# Patient Record
Sex: MALE | Race: WHITE | Employment: FULL TIME | URBAN - METROPOLITAN AREA
[De-identification: names, ages, dates, MRNs, and addresses within clinical notes are randomized per-mention and may not be internally consistent; named-entity substitution may affect disease eponyms.]

---

## 2022-05-24 ENCOUNTER — APPOINTMENT (OUTPATIENT)
Dept: GENERAL RADIOLOGY | Age: 27
End: 2022-05-24
Payer: COMMERCIAL

## 2022-05-24 ENCOUNTER — HOSPITAL ENCOUNTER (EMERGENCY)
Age: 27
Discharge: HOME | End: 2022-05-24
Attending: EMERGENCY MEDICINE
Payer: COMMERCIAL

## 2022-05-24 VITALS
OXYGEN SATURATION: 98 % | HEIGHT: 66 IN | RESPIRATION RATE: 16 BRPM | DIASTOLIC BLOOD PRESSURE: 92 MMHG | HEART RATE: 98 BPM | TEMPERATURE: 97.8 F | BODY MASS INDEX: 28.93 KG/M2 | SYSTOLIC BLOOD PRESSURE: 146 MMHG | WEIGHT: 180 LBS

## 2022-05-24 DIAGNOSIS — J20.9 ACUTE BRONCHITIS, UNSPECIFIED ORGANISM: Primary | ICD-10-CM

## 2022-05-24 PROCEDURE — 71046 X-RAY EXAM CHEST 2 VIEWS: CPT

## 2022-05-24 PROCEDURE — 99283 EMERGENCY DEPT VISIT LOW MDM: CPT

## 2022-05-24 RX ORDER — DOXYCYCLINE HYCLATE 100 MG
100 TABLET ORAL 2 TIMES DAILY
Qty: 20 TABLET | Refills: 0 | Status: SHIPPED | OUTPATIENT
Start: 2022-05-24 | End: 2022-06-03

## 2022-05-24 RX ORDER — BROMPHENIRAMINE MALEATE, PSEUDOEPHEDRINE HYDROCHLORIDE, AND DEXTROMETHORPHAN HYDROBROMIDE 2; 30; 10 MG/5ML; MG/5ML; MG/5ML
5 SYRUP ORAL 4 TIMES DAILY PRN
Qty: 120 ML | Refills: 0 | Status: SHIPPED | OUTPATIENT
Start: 2022-05-24

## 2022-05-24 ASSESSMENT — ENCOUNTER SYMPTOMS
SINUS PRESSURE: 0
EYE PAIN: 0
ABDOMINAL PAIN: 0
EYE REDNESS: 0
DIARRHEA: 0
BACK PAIN: 0
SHORTNESS OF BREATH: 0
SORE THROAT: 0
RHINORRHEA: 1
WHEEZING: 0
VOMITING: 0
COUGH: 1
NAUSEA: 0
EYE DISCHARGE: 0

## 2022-05-24 ASSESSMENT — PAIN - FUNCTIONAL ASSESSMENT: PAIN_FUNCTIONAL_ASSESSMENT: NONE - DENIES PAIN

## 2022-05-24 NOTE — ED PROVIDER NOTES
The history is provided by the patient. URI  Presenting symptoms: congestion, cough and rhinorrhea    Presenting symptoms: no ear pain, no fatigue, no fever and no sore throat    Severity:  Moderate  Onset quality:  Gradual  Duration:  3 days  Chronicity:  New  Associated symptoms: no arthralgias, no headaches and no wheezing         Review of Systems   Constitutional: Negative for chills, fatigue and fever. HENT: Positive for congestion and rhinorrhea. Negative for ear pain, sinus pressure and sore throat. Eyes: Negative for pain, discharge and redness. Respiratory: Positive for cough. Negative for shortness of breath and wheezing. Cardiovascular: Negative for chest pain. Gastrointestinal: Negative for abdominal pain, diarrhea, nausea and vomiting. Genitourinary: Negative for dysuria and frequency. Musculoskeletal: Negative for arthralgias and back pain. Skin: Negative for rash and wound. Neurological: Negative for weakness and headaches. Hematological: Negative for adenopathy. All other systems reviewed and are negative. Physical Exam  Vitals and nursing note reviewed. Constitutional:       Appearance: He is well-developed. HENT:      Head: Normocephalic and atraumatic. Jaw: No trismus. Right Ear: Hearing, tympanic membrane and external ear normal.      Left Ear: Hearing, tympanic membrane and external ear normal.      Nose: Mucosal edema and congestion present. Right Sinus: No maxillary sinus tenderness or frontal sinus tenderness. Left Sinus: No maxillary sinus tenderness or frontal sinus tenderness. Mouth/Throat:      Pharynx: Uvula midline. No uvula swelling. Eyes:      General: Lids are normal.      Conjunctiva/sclera: Conjunctivae normal.      Pupils: Pupils are equal, round, and reactive to light. Cardiovascular:      Rate and Rhythm: Normal rate and regular rhythm. Heart sounds: Normal heart sounds. No murmur heard.       Pulmonary: Effort: Pulmonary effort is normal. No respiratory distress. Breath sounds: Rhonchi present. No wheezing or rales. Abdominal:      General: Bowel sounds are normal.      Palpations: Abdomen is soft. Abdomen is not rigid. Tenderness: There is no abdominal tenderness. There is no guarding or rebound. Musculoskeletal:      Cervical back: Normal range of motion and neck supple. Skin:     General: Skin is warm and dry. Findings: No abrasion or rash. Neurological:      Mental Status: He is alert and oriented to person, place, and time. GCS: GCS eye subscore is 4. GCS verbal subscore is 5. GCS motor subscore is 6. Cranial Nerves: No cranial nerve deficit. Sensory: No sensory deficit. Coordination: Coordination normal.      Gait: Gait normal.          Procedures     MDM          --------------------------------------------- PAST HISTORY ---------------------------------------------  Past Medical History:  has no past medical history on file. Past Surgical History:  has no past surgical history on file. Social History:  reports that he has never smoked. He has never used smokeless tobacco. He reports current alcohol use of about 1.0 standard drink of alcohol per week. He reports that he does not use drugs. Family History: family history is not on file. The patients home medications have been reviewed. Allergies: Penicillins    -------------------------------------------------- RESULTS -------------------------------------------------  Labs:  No results found for this visit on 05/24/22. Radiology:  XR CHEST (2 VW)   Final Result   No acute process. ------------------------- NURSING NOTES AND VITALS REVIEWED ---------------------------  Date / Time Roomed:  5/24/2022  5:50 PM  ED Bed Assignment:  04/04    The nursing notes within the ED encounter and vital signs as below have been reviewed.    BP (!) 146/92   Pulse 98   Temp 97.8 °F (36.6 °C) (Temporal)   Resp 16   Ht 5' 6\" (1.676 m)   Wt 180 lb (81.6 kg)   SpO2 98%   BMI 29.05 kg/m²   Oxygen Saturation Interpretation: Normal      ------------------------------------------ PROGRESS NOTES ------------------------------------------  I have spoken with the patient and discussed todays results, in addition to providing specific details for the plan of care and counseling regarding the diagnosis and prognosis. Their questions are answered at this time and they are agreeable with the plan. I discussed at length with them reasons for immediate return here for re evaluation. They will followup with primary care by calling their office tomorrow. --------------------------------- ADDITIONAL PROVIDER NOTES ---------------------------------  At this time the patient is without objective evidence of an acute process requiring hospitalization or inpatient management. They have remained hemodynamically stable throughout their entire ED visit and are stable for discharge with outpatient follow-up. The plan has been discussed in detail and they are aware of the specific conditions for emergent return, as well as the importance of follow-up. New Prescriptions    BROMPHENIRAMINE-PSEUDOEPHEDRINE-DM 2-30-10 MG/5ML SYRUP    Take 5 mLs by mouth 4 times daily as needed for Congestion or Cough    DOXYCYCLINE HYCLATE (VIBRA-TABS) 100 MG TABLET    Take 1 tablet by mouth 2 times daily for 10 days       Diagnosis:  1. Acute bronchitis, unspecified organism        Disposition:  Patient's disposition: Discharge to home  Patient's condition is stable.                       Luz Sandoval MD  05/24/22 [id number]

## 2023-08-27 ENCOUNTER — APPOINTMENT (OUTPATIENT)
Dept: GENERAL RADIOLOGY | Age: 28
End: 2023-08-27
Payer: COMMERCIAL

## 2023-08-27 ENCOUNTER — APPOINTMENT (OUTPATIENT)
Dept: CT IMAGING | Age: 28
End: 2023-08-27
Payer: COMMERCIAL

## 2023-08-27 ENCOUNTER — HOSPITAL ENCOUNTER (EMERGENCY)
Age: 28
Discharge: HOME | End: 2023-08-27
Attending: EMERGENCY MEDICINE
Payer: COMMERCIAL

## 2023-08-27 VITALS
TEMPERATURE: 98.2 F | DIASTOLIC BLOOD PRESSURE: 77 MMHG | BODY MASS INDEX: 28.25 KG/M2 | RESPIRATION RATE: 16 BRPM | HEIGHT: 67 IN | HEART RATE: 87 BPM | OXYGEN SATURATION: 96 % | WEIGHT: 180 LBS | SYSTOLIC BLOOD PRESSURE: 142 MMHG

## 2023-08-27 DIAGNOSIS — S09.90XA CLOSED HEAD INJURY, INITIAL ENCOUNTER: Primary | ICD-10-CM

## 2023-08-27 DIAGNOSIS — S06.0X0A CONCUSSION WITHOUT LOSS OF CONSCIOUSNESS, INITIAL ENCOUNTER: ICD-10-CM

## 2023-08-27 PROCEDURE — 96375 TX/PRO/DX INJ NEW DRUG ADDON: CPT

## 2023-08-27 PROCEDURE — 2580000003 HC RX 258: Performed by: EMERGENCY MEDICINE

## 2023-08-27 PROCEDURE — 6360000002 HC RX W HCPCS: Performed by: EMERGENCY MEDICINE

## 2023-08-27 PROCEDURE — 99284 EMERGENCY DEPT VISIT MOD MDM: CPT

## 2023-08-27 PROCEDURE — 96374 THER/PROPH/DIAG INJ IV PUSH: CPT

## 2023-08-27 PROCEDURE — 73130 X-RAY EXAM OF HAND: CPT

## 2023-08-27 PROCEDURE — 70450 CT HEAD/BRAIN W/O DYE: CPT

## 2023-08-27 RX ORDER — DIPHENHYDRAMINE HYDROCHLORIDE 50 MG/ML
25 INJECTION INTRAMUSCULAR; INTRAVENOUS ONCE
Status: COMPLETED | OUTPATIENT
Start: 2023-08-27 | End: 2023-08-27

## 2023-08-27 RX ORDER — 0.9 % SODIUM CHLORIDE 0.9 %
1000 INTRAVENOUS SOLUTION INTRAVENOUS ONCE
Status: COMPLETED | OUTPATIENT
Start: 2023-08-27 | End: 2023-08-27

## 2023-08-27 RX ORDER — METOCLOPRAMIDE HYDROCHLORIDE 5 MG/ML
10 INJECTION INTRAMUSCULAR; INTRAVENOUS ONCE
Status: COMPLETED | OUTPATIENT
Start: 2023-08-27 | End: 2023-08-27

## 2023-08-27 RX ADMIN — DIPHENHYDRAMINE HYDROCHLORIDE 25 MG: 50 INJECTION INTRAMUSCULAR; INTRAVENOUS at 14:32

## 2023-08-27 RX ADMIN — METOCLOPRAMIDE 10 MG: 5 INJECTION, SOLUTION INTRAMUSCULAR; INTRAVENOUS at 14:32

## 2023-08-27 RX ADMIN — SODIUM CHLORIDE 1000 ML: 9 INJECTION, SOLUTION INTRAVENOUS at 14:32

## 2023-08-27 ASSESSMENT — PAIN SCALES - GENERAL: PAINLEVEL_OUTOF10: 5

## 2023-08-27 ASSESSMENT — LIFESTYLE VARIABLES
HOW MANY STANDARD DRINKS CONTAINING ALCOHOL DO YOU HAVE ON A TYPICAL DAY: 1 OR 2
HOW OFTEN DO YOU HAVE A DRINK CONTAINING ALCOHOL: MONTHLY OR LESS

## 2023-08-27 ASSESSMENT — PAIN - FUNCTIONAL ASSESSMENT: PAIN_FUNCTIONAL_ASSESSMENT: 0-10
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
## 2023-09-02 ENCOUNTER — HOSPITAL ENCOUNTER (OUTPATIENT)
Dept: GENERAL RADIOLOGY | Age: 28
End: 2023-09-02

## 2023-09-02 ENCOUNTER — HOSPITAL ENCOUNTER (OUTPATIENT)
Age: 28
End: 2023-09-02

## 2023-09-02 DIAGNOSIS — S69.91XD INJURY OF RIGHT HAND, SUBSEQUENT ENCOUNTER: ICD-10-CM

## 2023-09-02 PROCEDURE — 73130 X-RAY EXAM OF HAND: CPT

## 2024-05-01 ENCOUNTER — HOSPITAL ENCOUNTER (EMERGENCY)
Age: 29
Discharge: HOME | End: 2024-05-01
Attending: FAMILY MEDICINE
Payer: COMMERCIAL

## 2024-05-01 VITALS
HEART RATE: 105 BPM | TEMPERATURE: 98.4 F | RESPIRATION RATE: 16 BRPM | DIASTOLIC BLOOD PRESSURE: 105 MMHG | SYSTOLIC BLOOD PRESSURE: 157 MMHG | OXYGEN SATURATION: 100 %

## 2024-05-01 DIAGNOSIS — S16.1XXA STRAIN OF NECK MUSCLE, INITIAL ENCOUNTER: ICD-10-CM

## 2024-05-01 DIAGNOSIS — J40 BRONCHITIS: Primary | ICD-10-CM

## 2024-05-01 PROCEDURE — 99283 EMERGENCY DEPT VISIT LOW MDM: CPT

## 2024-05-01 RX ORDER — IBUPROFEN 600 MG/1
600 TABLET ORAL 3 TIMES DAILY PRN
Qty: 30 TABLET | Refills: 0 | Status: SHIPPED | OUTPATIENT
Start: 2024-05-01

## 2024-05-01 RX ORDER — ONDANSETRON 4 MG/1
4 TABLET, ORALLY DISINTEGRATING ORAL 3 TIMES DAILY PRN
Qty: 5 TABLET | Refills: 0 | Status: SHIPPED | OUTPATIENT
Start: 2024-05-01

## 2024-05-01 RX ORDER — DEXTROMETHORPHAN HYDROBROMIDE AND PROMETHAZINE HYDROCHLORIDE 15; 6.25 MG/5ML; MG/5ML
5 SYRUP ORAL 4 TIMES DAILY PRN
Qty: 118 ML | Refills: 0 | Status: SHIPPED | OUTPATIENT
Start: 2024-05-01 | End: 2024-05-08

## 2024-05-01 RX ORDER — LISINOPRIL AND HYDROCHLOROTHIAZIDE 12.5; 1 MG/1; MG/1
1 TABLET ORAL DAILY
COMMUNITY
Start: 2024-04-02

## 2024-05-01 RX ORDER — METOPROLOL SUCCINATE 25 MG/1
25 TABLET, EXTENDED RELEASE ORAL DAILY
COMMUNITY
Start: 2024-03-16

## 2024-05-01 ASSESSMENT — PAIN DESCRIPTION - DESCRIPTORS: DESCRIPTORS: PRESSURE

## 2024-05-01 ASSESSMENT — PAIN - FUNCTIONAL ASSESSMENT: PAIN_FUNCTIONAL_ASSESSMENT: 0-10

## 2024-05-01 ASSESSMENT — PAIN SCALES - GENERAL: PAINLEVEL_OUTOF10: 6

## 2024-05-01 ASSESSMENT — PAIN DESCRIPTION - LOCATION: LOCATION: HEAD

## 2024-05-01 NOTE — ED PROVIDER NOTES
HPI:  5/1/24,   Time: 4:01 PM EDT         Ronka Hemphill is a 28 y.o. male presenting to the ED for acute onset yesterday morning of a cough and right-sided neck pain, the pain is worse when he coughs and it is an aching type pain.  The cough is not mostly nonproductive.  He did have some nausea this morning but no emesis, he is get onset yesterday morning of cough and right-sided neck pain, the pain is worse when he coughs and it is an aching type pain.  The cough is mostly nonproductive.  He did have some nausea this morning but no emesis.  He denies fever chills or bodyaches.        ROS:   Pertinent positives and negatives are stated within HPI, all other systems reviewed and are negative.  --------------------------------------------- PAST HISTORY ---------------------------------------------  Past Medical History:  has a past medical history of ADHD and Asthma.    Past Surgical History:  has no past surgical history on file.    Social History:  reports that he has never smoked. He has never used smokeless tobacco. He reports current alcohol use of about 1.0 standard drink of alcohol per week. He reports that he does not use drugs.    Family History: family history is not on file.     The patient’s home medications have been reviewed.    Allergies: Penicillins    -------------------------------------------------- RESULTS -------------------------------------------------  All laboratory and radiology results have been personally reviewed by myself   LABS:  No results found for this visit on 05/01/24.    RADIOLOGY:  Interpreted by Radiologist.  No orders to display       ------------------------- NURSING NOTES AND VITALS REVIEWED ---------------------------   The nursing notes within the ED encounter and vital signs as below have been reviewed.   BP (!) 157/105   Pulse (!) 105   Temp 98.4 °F (36.9 °C) (Temporal)   Resp 16   SpO2 100%   Oxygen Saturation Interpretation:

## 2024-05-10 ENCOUNTER — APPOINTMENT (OUTPATIENT)
Dept: GENERAL RADIOLOGY | Age: 29
End: 2024-05-10
Payer: COMMERCIAL

## 2024-05-10 ENCOUNTER — HOSPITAL ENCOUNTER (EMERGENCY)
Age: 29
Discharge: HOME | End: 2024-05-11
Attending: EMERGENCY MEDICINE
Payer: COMMERCIAL

## 2024-05-10 ENCOUNTER — APPOINTMENT (OUTPATIENT)
Dept: CT IMAGING | Age: 29
End: 2024-05-10
Payer: COMMERCIAL

## 2024-05-10 DIAGNOSIS — R51.9 ACUTE NONINTRACTABLE HEADACHE, UNSPECIFIED HEADACHE TYPE: ICD-10-CM

## 2024-05-10 DIAGNOSIS — R07.9 CHEST PAIN, UNSPECIFIED TYPE: Primary | ICD-10-CM

## 2024-05-10 DIAGNOSIS — R03.0 ELEVATED BLOOD PRESSURE READING: ICD-10-CM

## 2024-05-10 LAB
ANION GAP SERPL CALCULATED.3IONS-SCNC: NORMAL MMOL/L (ref 9–17)
BNP SERPL-MCNC: <36 PG/ML (ref 0–450)
BUN SERPL-MCNC: NORMAL MG/DL (ref 6–20)
BUN/CREAT SERPL: NORMAL (ref 9–20)
CALCIUM SERPL-MCNC: NORMAL MG/DL (ref 8.6–10.4)
CHLORIDE SERPL-SCNC: NORMAL MMOL/L (ref 98–107)
CO2 SERPL-SCNC: NORMAL MMOL/L (ref 20–31)
CREAT SERPL-MCNC: NORMAL MG/DL (ref 0.7–1.2)
ERYTHROCYTE [DISTWIDTH] IN BLOOD BY AUTOMATED COUNT: 12.2 % (ref 11.5–15)
GFR, ESTIMATED: NORMAL ML/MIN/1.73M2
GLUCOSE BLD-MCNC: 99 MG/DL (ref 74–99)
GLUCOSE SERPL-MCNC: NORMAL MG/DL (ref 70–99)
HCT VFR BLD AUTO: 47 % (ref 37–54)
HGB BLD-MCNC: 16.7 G/DL (ref 12.5–16.5)
MCH RBC QN AUTO: 31.6 PG (ref 26–35)
MCHC RBC AUTO-ENTMCNC: 35.5 G/DL (ref 32–34.5)
MCV RBC AUTO: 88.8 FL (ref 80–99.9)
PLATELET # BLD AUTO: 249 K/UL (ref 130–450)
PMV BLD AUTO: 11.9 FL (ref 7–12)
POTASSIUM SERPL-SCNC: NORMAL MMOL/L (ref 3.7–5.3)
RBC # BLD AUTO: 5.29 M/UL (ref 3.8–5.8)
SODIUM SERPL-SCNC: NORMAL MMOL/L (ref 135–144)
TROPONIN I SERPL HS-MCNC: <6 NG/L (ref 0–11)
TROPONIN I SERPL HS-MCNC: NORMAL NG/L (ref 0–22)
TROPONIN INTERP: NORMAL
TROPONIN T SERPL-MCNC: NORMAL NG/ML
WBC OTHER # BLD: 8.5 K/UL (ref 4.5–11.5)

## 2024-05-10 PROCEDURE — 84484 ASSAY OF TROPONIN QUANT: CPT

## 2024-05-10 PROCEDURE — 70450 CT HEAD/BRAIN W/O DYE: CPT

## 2024-05-10 PROCEDURE — 85027 COMPLETE CBC AUTOMATED: CPT

## 2024-05-10 PROCEDURE — 96374 THER/PROPH/DIAG INJ IV PUSH: CPT

## 2024-05-10 PROCEDURE — 93005 ELECTROCARDIOGRAM TRACING: CPT | Performed by: EMERGENCY MEDICINE

## 2024-05-10 PROCEDURE — 96375 TX/PRO/DX INJ NEW DRUG ADDON: CPT

## 2024-05-10 PROCEDURE — 83880 ASSAY OF NATRIURETIC PEPTIDE: CPT

## 2024-05-10 PROCEDURE — 71046 X-RAY EXAM CHEST 2 VIEWS: CPT

## 2024-05-10 PROCEDURE — 99285 EMERGENCY DEPT VISIT HI MDM: CPT

## 2024-05-10 PROCEDURE — 6360000002 HC RX W HCPCS: Performed by: EMERGENCY MEDICINE

## 2024-05-10 PROCEDURE — 82962 GLUCOSE BLOOD TEST: CPT

## 2024-05-10 RX ORDER — DIPHENHYDRAMINE HYDROCHLORIDE 50 MG/ML
25 INJECTION INTRAMUSCULAR; INTRAVENOUS ONCE
Status: COMPLETED | OUTPATIENT
Start: 2024-05-10 | End: 2024-05-10

## 2024-05-10 RX ORDER — METOCLOPRAMIDE HYDROCHLORIDE 5 MG/ML
10 INJECTION INTRAMUSCULAR; INTRAVENOUS ONCE
Status: COMPLETED | OUTPATIENT
Start: 2024-05-10 | End: 2024-05-10

## 2024-05-10 RX ADMIN — METOCLOPRAMIDE 10 MG: 5 INJECTION, SOLUTION INTRAMUSCULAR; INTRAVENOUS at 20:41

## 2024-05-10 RX ADMIN — DIPHENHYDRAMINE HYDROCHLORIDE 25 MG: 50 INJECTION INTRAMUSCULAR; INTRAVENOUS at 20:41

## 2024-05-10 ASSESSMENT — ENCOUNTER SYMPTOMS
NAUSEA: 0
BACK PAIN: 0
DIARRHEA: 0
COUGH: 0
WHEEZING: 0
SHORTNESS OF BREATH: 0
SINUS PRESSURE: 0
ABDOMINAL PAIN: 0
EYE PAIN: 0
SORE THROAT: 0
EYE REDNESS: 0
VOMITING: 0
EYE DISCHARGE: 0

## 2024-05-10 ASSESSMENT — LIFESTYLE VARIABLES: HOW MANY STANDARD DRINKS CONTAINING ALCOHOL DO YOU HAVE ON A TYPICAL DAY: PATIENT DOES NOT DRINK

## 2024-05-10 NOTE — ED TRIAGE NOTES
Department of Emergency Medicine  PROVIDER IN TRIAGE NOTE                        5/10/24  6:27 PM EDT    Date of Encounter: 5/10/24   MRN: [id number]      HPI: Ronak Hemphill is a 28 y.o. male who presents to the ED for No chief complaint on file.       Went to PCP today because he has been having a headache and a cough.  In the office his blood pressure was elevated.  They did an EKG and then told him to go to the ER for an abnormal EKG.  Denies any cardiac history.      Provider-Patient relationship only established for Provider In Triage (PIT).  Supervisor request for APC to initiate contact and input an assessment note in triage during high volume surges.  Full assessment, HPI and examination not performed.  Secondary to high volume, low staffing, and/or boarding- patient to await bed availability.  This ends my PIT-Patient relationship.   Electronically signed by Dixie Melgoza PA-C   DD: 5/10/24

## 2024-05-11 VITALS
HEART RATE: 80 BPM | SYSTOLIC BLOOD PRESSURE: 144 MMHG | DIASTOLIC BLOOD PRESSURE: 85 MMHG | RESPIRATION RATE: 18 BRPM | WEIGHT: 201.9 LBS | OXYGEN SATURATION: 99 % | BODY MASS INDEX: 31.62 KG/M2 | TEMPERATURE: 98.4 F

## 2024-05-11 LAB
ANION GAP SERPL CALCULATED.3IONS-SCNC: 10 MMOL/L (ref 7–16)
BUN SERPL-MCNC: 14 MG/DL (ref 6–20)
CALCIUM SERPL-MCNC: 9.3 MG/DL (ref 8.6–10.2)
CHLORIDE SERPL-SCNC: 103 MMOL/L (ref 98–107)
CO2 SERPL-SCNC: 26 MMOL/L (ref 22–29)
CREAT SERPL-MCNC: 1.2 MG/DL (ref 0.7–1.2)
EKG ATRIAL RATE: 83 BPM
EKG P AXIS: 56 DEGREES
EKG P-R INTERVAL: 138 MS
EKG Q-T INTERVAL: 350 MS
EKG QRS DURATION: 96 MS
EKG QTC CALCULATION (BAZETT): 411 MS
EKG R AXIS: 18 DEGREES
EKG T AXIS: 55 DEGREES
EKG VENTRICULAR RATE: 83 BPM
GFR, ESTIMATED: >90 ML/MIN/1.73M2
GLUCOSE SERPL-MCNC: 106 MG/DL (ref 74–99)
POTASSIUM SERPL-SCNC: 3.8 MMOL/L (ref 3.5–5)
SODIUM SERPL-SCNC: 139 MMOL/L (ref 132–146)

## 2024-05-11 PROCEDURE — 80048 BASIC METABOLIC PNL TOTAL CA: CPT

## 2024-05-11 PROCEDURE — 36415 COLL VENOUS BLD VENIPUNCTURE: CPT

## 2024-05-11 PROCEDURE — 93010 ELECTROCARDIOGRAM REPORT: CPT | Performed by: INTERNAL MEDICINE

## 2024-05-11 NOTE — ED PROVIDER NOTES
Patient is a 27 y/o male who presents to the ED with a reported abnormal EKG. Patient states that he was seen at his PCP's office today and sent here after he was noted to have an abnormal EKG. He does not know the abnormality. He states that he was there for a headache which he has had for the past couple weeks. His headache is frontal and is currently 7/10. It is frontal and bilateral. He denies any fever, trauma or neck stiffness. He states that he was noted to have high blood pressure at the office today. He does report right sided chest pain. He denies any shortness of breath.         Review of Systems   Constitutional:  Negative for chills and fever.   HENT:  Negative for ear pain, sinus pressure and sore throat.    Eyes:  Negative for pain, discharge and redness.   Respiratory:  Negative for cough, shortness of breath and wheezing.    Cardiovascular:  Positive for chest pain.   Gastrointestinal:  Negative for abdominal pain, diarrhea, nausea and vomiting.   Genitourinary:  Negative for dysuria and frequency.   Musculoskeletal:  Negative for arthralgias and back pain.   Skin:  Negative for rash and wound.   Neurological:  Positive for headaches. Negative for weakness.   Hematological:  Negative for adenopathy.   All other systems reviewed and are negative.       Physical Exam  Vitals and nursing note reviewed.   Constitutional:       General: He is not in acute distress.  HENT:      Head: Normocephalic and atraumatic.      Right Ear: External ear normal.      Left Ear: External ear normal.      Nose: Nose normal.      Mouth/Throat:      Mouth: Mucous membranes are moist.   Eyes:      Conjunctiva/sclera: Conjunctivae normal.      Pupils: Pupils are equal, round, and reactive to light.   Neck:      Comments: No meningeal signs.  Cardiovascular:      Rate and Rhythm: Normal rate and regular rhythm.      Heart sounds: No murmur heard.  Pulmonary:      Effort: Pulmonary effort is normal. No respiratory distress.  hemolyzed. 9 - 20    Calcium Unable to perform testing: Specimen hemolyzed. 8.6 - 10.4 mg/dL   CBC   Result Value Ref Range    WBC 8.5 4.5 - 11.5 k/uL    RBC 5.29 3.80 - 5.80 m/uL    Hemoglobin 16.7 (H) 12.5 - 16.5 g/dL    Hematocrit 47.0 37.0 - 54.0 %    MCV 88.8 80.0 - 99.9 fL    MCH 31.6 26.0 - 35.0 pg    MCHC 35.5 (H) 32.0 - 34.5 g/dL    RDW 12.2 11.5 - 15.0 %    Platelets 249 130 - 450 k/uL    MPV 11.9 7.0 - 12.0 fL   Troponin   Result Value Ref Range    Troponin, High Sensitivity Unable to perform testing: Specimen hemolyzed. 0 - 22 ng/L    Troponin T Unable to perform testing: Specimen hemolyzed. <0.03 ng/mL    Troponin Interp Unable to perform testing: Specimen hemolyzed.    Troponin   Result Value Ref Range    Troponin, High Sensitivity <6 0 - 11 ng/L   Brain Natriuretic Peptide   Result Value Ref Range    Pro-BNP <36 0 - 450 pg/mL   Basic Metabolic Panel   Result Value Ref Range    Sodium 139 132 - 146 mmol/L    Potassium 3.8 3.5 - 5.0 mmol/L    Chloride 103 98 - 107 mmol/L    CO2 26 22 - 29 mmol/L    Anion Gap 10 7 - 16 mmol/L    Glucose 106 (H) 74 - 99 mg/dL    BUN 14 6 - 20 mg/dL    Creatinine 1.2 0.70 - 1.20 mg/dL    Est, Glom Filt Rate >90 >60 mL/min/1.73m2    Calcium 9.3 8.6 - 10.2 mg/dL   POCT Glucose   Result Value Ref Range    POC Glucose 99 74 - 99 mg/dL   EKG 12 Lead   Result Value Ref Range    Ventricular Rate 83 BPM    Atrial Rate 83 BPM    P-R Interval 138 ms    QRS Duration 96 ms    Q-T Interval 350 ms    QTc Calculation (Bazett) 411 ms    P Axis 56 degrees    R Axis 18 degrees    T Axis 55 degrees       Radiology:  XR CHEST (2 VW)   Final Result   No acute process.         CT HEAD WO CONTRAST   Final Result   No acute intracranial abnormality.             ------------------------- NURSING NOTES AND VITALS REVIEWED ---------------------------  Date / Time Roomed:  5/10/2024  8:02 PM  ED Bed Assignment:  23/23    The nursing notes within the ED encounter and vital signs as below have been

## 2024-06-04 NOTE — DISCHARGE INSTRUCTIONS
Continue home meds:    ALPRAZolam tablet 0.5 mg, 0.5 mg, Oral, BID PRN, anxiety    OLANZapine zydis disintegrating tablet 5 mg, 5 mg, Oral, BID     trazodone split tablet 100 mg, 100 mg, Oral, QHS     venlafaxine 24 hr capsule 150 mg, 150 mg, Oral, Daily    Return to the ER if you have worsening headache, vomiting, unable to keep down food or drink, difficulty walking, talking, or seeing, stiffness in your neck, or any other new or concerning symptoms. Follow up with your primary care physician at the next available appointment.